# Patient Record
Sex: MALE | Race: BLACK OR AFRICAN AMERICAN | NOT HISPANIC OR LATINO | URBAN - METROPOLITAN AREA
[De-identification: names, ages, dates, MRNs, and addresses within clinical notes are randomized per-mention and may not be internally consistent; named-entity substitution may affect disease eponyms.]

---

## 2018-12-05 ENCOUNTER — EMERGENCY (EMERGENCY)
Facility: HOSPITAL | Age: 43
LOS: 0 days | Discharge: HOME | End: 2018-12-05
Attending: EMERGENCY MEDICINE
Payer: MEDICAID

## 2018-12-05 VITALS
WEIGHT: 164.91 LBS | DIASTOLIC BLOOD PRESSURE: 63 MMHG | TEMPERATURE: 98 F | SYSTOLIC BLOOD PRESSURE: 107 MMHG | HEART RATE: 82 BPM | HEIGHT: 66 IN | RESPIRATION RATE: 17 BRPM | OXYGEN SATURATION: 98 %

## 2018-12-05 VITALS
OXYGEN SATURATION: 100 % | SYSTOLIC BLOOD PRESSURE: 116 MMHG | RESPIRATION RATE: 16 BRPM | DIASTOLIC BLOOD PRESSURE: 73 MMHG | TEMPERATURE: 98 F | HEART RATE: 82 BPM

## 2018-12-05 DIAGNOSIS — K08.89 OTHER SPECIFIED DISORDERS OF TEETH AND SUPPORTING STRUCTURES: ICD-10-CM

## 2018-12-05 DIAGNOSIS — K04.7 PERIAPICAL ABSCESS WITHOUT SINUS: ICD-10-CM

## 2018-12-05 PROCEDURE — 99283 EMERGENCY DEPT VISIT LOW MDM: CPT | Mod: 25

## 2018-12-05 RX ORDER — PENICILLIN V POTASSIUM 250 MG
500 TABLET ORAL ONCE
Qty: 0 | Refills: 0 | Status: COMPLETED | OUTPATIENT
Start: 2018-12-05 | End: 2018-12-05

## 2018-12-05 RX ORDER — IBUPROFEN 200 MG
1 TABLET ORAL
Qty: 20 | Refills: 0 | OUTPATIENT
Start: 2018-12-05 | End: 2018-12-09

## 2018-12-05 RX ORDER — IBUPROFEN 200 MG
600 TABLET ORAL ONCE
Qty: 0 | Refills: 0 | Status: COMPLETED | OUTPATIENT
Start: 2018-12-05 | End: 2018-12-05

## 2018-12-05 RX ORDER — PENICILLIN V POTASSIUM 250 MG
1 TABLET ORAL
Qty: 40 | Refills: 0 | OUTPATIENT
Start: 2018-12-05 | End: 2018-12-14

## 2018-12-05 RX ADMIN — Medication 500 MILLIGRAM(S): at 07:31

## 2018-12-05 RX ADMIN — Medication 600 MILLIGRAM(S): at 07:31

## 2018-12-05 RX ADMIN — Medication 600 MILLIGRAM(S): at 07:45

## 2018-12-05 NOTE — ED PROVIDER NOTE - PHYSICAL EXAMINATION
Vitals:   Gen: AAOx3, NAD, sitting comfortably in stretcher  Head: ncat, perrla, eomi b/l  Neck: supple, no lymphadenopathy, no midline deviation  Heart: rrr, no m/r/g  Lungs: CTA b/l, no rales/ronchi/wheezes  Abd: soft, nontender, non-distended, no rebound or guarding  Ext: no clubbing/cyanosis/edema  Neuro: sensation and muscle strength intact b/l, steady gait   ENT: tooth #17/18 decayed, local gum swelling, no pus/fluid collection, otherwise normal exam, no uvular deviation, patent airway, no erythema

## 2018-12-05 NOTE — ED ADULT NURSE NOTE - CAS EDN DISCHARGE ASSESSMENT
Alert and oriented to person, place and time/Symptoms improved/No adverse reaction to first time med in ED

## 2018-12-05 NOTE — ED ADULT NURSE NOTE - OBJECTIVE STATEMENT
42 y/o male no pmhx presents to the ed with l- side toothache, patient states I was supposed to have a dentist appointment in a month but the pain is getting worse so my aunt told me to go the doctor. denies fevers

## 2018-12-05 NOTE — ED PROVIDER NOTE - MEDICAL DECISION MAKING DETAILS
42 yo M with dental infection  -pen VK, motrin, percocet prn, will refer to dental  -d/c with meds  -return instructions discussed with patient

## 2018-12-05 NOTE — ED ADULT NURSE NOTE - NSIMPLEMENTINTERV_GEN_ALL_ED
Implemented All Universal Safety Interventions:  [place] to call system. Call bell, personal items and telephone within reach. Instruct patient to call for assistance. Room bathroom lighting operational. Non-slip footwear when patient is off stretcher. Physically safe environment: no spills, clutter or unnecessary equipment. Stretcher in lowest position, wheels locked, appropriate side rails in place.

## 2018-12-05 NOTE — ED PROVIDER NOTE - OBJECTIVE STATEMENT
44 yo M with dental pain for 2 days.  L lower wisdom tooth hurts with chewing.  He's had problems with the tooth before, scheduled a root canal with dental in 2  months.  It's just hurting and he wants something for it.  No other complaints.   ROS: negative for fever, cough, headache, chest pain, shortness of breath, abd pain, nausea, vomiting, diarrhea, rash, paresthesia, and weakness--all other systems reviewed are negative.   PMH: negative; Meds: Denies; SH: Denies smoking/drinking/drug use

## 2021-08-16 NOTE — ED PROVIDER NOTE - NSDCPRINTRESULTS_ED_ALL_ED
TRANSFER - OUT REPORT:    Verbal report given to Preston Douglass RN(name) on Elmo Layne  being transferred to [address] [place] [address](unit) for routine progression of care       Report consisted of patients Situation, Background, Assessment and   Recommendations(SBAR). Information from the following report(s) SBAR, Procedure Summary and MAR was reviewed with the receiving nurse. Lines:   Peripheral IV 08/15/21 Left;Posterior Hand (Active)   Site Assessment Clean, dry, & intact 08/16/21 0800   Phlebitis Assessment 0 08/16/21 0800   Infiltration Assessment 0 08/16/21 0800   Dressing Status Clean, dry, & intact 08/16/21 0800   Dressing Type Transparent;Tape 08/16/21 0800   Hub Color/Line Status Pink;Patent;Capped 08/16/21 0800   Action Taken Open ports on tubing capped 08/16/21 0800   Alcohol Cap Used Yes 08/16/21 0800       Peripheral IV 08/15/21 Posterior;Right Hand (Active)   Site Assessment Clean, dry, & intact 08/16/21 0800   Phlebitis Assessment 0 08/16/21 0800   Infiltration Assessment 0 08/16/21 0800   Dressing Status Clean, dry, & intact 08/16/21 0800   Dressing Type Transparent;Tape 08/16/21 0800   Hub Color/Line Status Blue;Patent;Capped 08/16/21 0800   Action Taken Open ports on tubing capped 08/16/21 0800   Alcohol Cap Used Yes 08/16/21 0800        Opportunity for questions and clarification was provided.       Patient transported with:   Agrivida Patient requests all Lab and Radiology Results on their Discharge Instructions